# Patient Record
Sex: MALE | Race: WHITE | Employment: FULL TIME | URBAN - METROPOLITAN AREA
[De-identification: names, ages, dates, MRNs, and addresses within clinical notes are randomized per-mention and may not be internally consistent; named-entity substitution may affect disease eponyms.]

---

## 2017-01-11 PROCEDURE — 87081 CULTURE SCREEN ONLY: CPT | Performed by: INTERNAL MEDICINE

## 2017-01-11 PROCEDURE — 81003 URINALYSIS AUTO W/O SCOPE: CPT | Performed by: INTERNAL MEDICINE

## 2017-01-16 NOTE — OR NURSING
Patient states unable to attend spine class due to inability to travel and sit due to back pain. Instructed patient to view class on line.

## 2017-02-02 ENCOUNTER — APPOINTMENT (OUTPATIENT)
Dept: GENERAL RADIOLOGY | Facility: HOSPITAL | Age: 52
End: 2017-02-02
Attending: ORTHOPAEDIC SURGERY
Payer: COMMERCIAL

## 2017-02-02 ENCOUNTER — ANESTHESIA (OUTPATIENT)
Dept: SURGERY | Facility: HOSPITAL | Age: 52
End: 2017-02-02

## 2017-02-02 ENCOUNTER — HOSPITAL ENCOUNTER (OUTPATIENT)
Facility: HOSPITAL | Age: 52
Setting detail: HOSPITAL OUTPATIENT SURGERY
Discharge: HOME | End: 2017-02-02
Attending: ORTHOPAEDIC SURGERY | Admitting: ORTHOPAEDIC SURGERY
Payer: COMMERCIAL

## 2017-02-02 ENCOUNTER — ANESTHESIA EVENT (OUTPATIENT)
Dept: SURGERY | Facility: HOSPITAL | Age: 52
End: 2017-02-02

## 2017-02-02 ENCOUNTER — SURGERY (OUTPATIENT)
Age: 52
End: 2017-02-02

## 2017-02-02 VITALS
HEIGHT: 71 IN | TEMPERATURE: 98 F | HEART RATE: 95 BPM | RESPIRATION RATE: 16 BRPM | OXYGEN SATURATION: 98 % | WEIGHT: 209 LBS | BODY MASS INDEX: 29.26 KG/M2 | SYSTOLIC BLOOD PRESSURE: 134 MMHG | DIASTOLIC BLOOD PRESSURE: 83 MMHG

## 2017-02-02 DIAGNOSIS — G89.29 CHRONIC BILATERAL LOW BACK PAIN WITH LEFT-SIDED SCIATICA: Primary | ICD-10-CM

## 2017-02-02 DIAGNOSIS — M54.42 CHRONIC BILATERAL LOW BACK PAIN WITH LEFT-SIDED SCIATICA: Primary | ICD-10-CM

## 2017-02-02 PROCEDURE — 88311 DECALCIFY TISSUE: CPT | Performed by: ORTHOPAEDIC SURGERY

## 2017-02-02 PROCEDURE — 88304 TISSUE EXAM BY PATHOLOGIST: CPT | Performed by: ORTHOPAEDIC SURGERY

## 2017-02-02 PROCEDURE — 72020 X-RAY EXAM OF SPINE 1 VIEW: CPT | Performed by: ORTHOPAEDIC SURGERY

## 2017-02-02 PROCEDURE — 0ST20ZZ RESECTION OF LUMBAR VERTEBRAL DISC, OPEN APPROACH: ICD-10-PCS | Performed by: ORTHOPAEDIC SURGERY

## 2017-02-02 PROCEDURE — 01NB0ZZ RELEASE LUMBAR NERVE, OPEN APPROACH: ICD-10-PCS | Performed by: ORTHOPAEDIC SURGERY

## 2017-02-02 DEVICE — FLOSEAL SEALENT STERILE 10ML: Type: IMPLANTABLE DEVICE | Site: BACK | Status: FUNCTIONAL

## 2017-02-02 DEVICE — DURAGENXS MATRIX DURAL REGENERATION MATRIX IS AN ABSORBABLE IMPLANT FOR REPAIR OF DURAL DEFECTS. DURAGENXS MATRIX IS AN EASY TO HANDLE, SOFT, WHITE, PLIABLE, NONFRIABLE, POROUS COLLAGEN MATRIX. DURAGENXS MATRIX IS SUPPLIED STERILE, NONPYROGENIC, FOR SINGLE USE IN DOUBLE PEEL PACKAGES IN A VARIETY OF SIZES.
Type: IMPLANTABLE DEVICE | Site: BACK | Status: FUNCTIONAL
Brand: DURAGEN XS™ DURAL REGENERATION MATRIX

## 2017-02-02 RX ORDER — MIDAZOLAM HYDROCHLORIDE 1 MG/ML
1 INJECTION INTRAMUSCULAR; INTRAVENOUS EVERY 5 MIN PRN
Status: DISCONTINUED | OUTPATIENT
Start: 2017-02-02 | End: 2017-02-02

## 2017-02-02 RX ORDER — BUPIVACAINE HYDROCHLORIDE AND EPINEPHRINE 5; 5 MG/ML; UG/ML
INJECTION, SOLUTION EPIDURAL; INTRACAUDAL; PERINEURAL AS NEEDED
Status: DISCONTINUED | OUTPATIENT
Start: 2017-02-02 | End: 2017-02-02 | Stop reason: HOSPADM

## 2017-02-02 RX ORDER — MEPERIDINE HYDROCHLORIDE 25 MG/ML
INJECTION INTRAMUSCULAR; INTRAVENOUS; SUBCUTANEOUS
Status: COMPLETED
Start: 2017-02-02 | End: 2017-02-02

## 2017-02-02 RX ORDER — DIPHENHYDRAMINE HYDROCHLORIDE 50 MG/ML
12.5 INJECTION INTRAMUSCULAR; INTRAVENOUS AS NEEDED
Status: DISCONTINUED | OUTPATIENT
Start: 2017-02-02 | End: 2017-02-02

## 2017-02-02 RX ORDER — NALOXONE HYDROCHLORIDE 0.4 MG/ML
80 INJECTION, SOLUTION INTRAMUSCULAR; INTRAVENOUS; SUBCUTANEOUS AS NEEDED
Status: DISCONTINUED | OUTPATIENT
Start: 2017-02-02 | End: 2017-02-02

## 2017-02-02 RX ORDER — HYDROMORPHONE HYDROCHLORIDE 1 MG/ML
0.4 INJECTION, SOLUTION INTRAMUSCULAR; INTRAVENOUS; SUBCUTANEOUS EVERY 5 MIN PRN
Status: DISCONTINUED | OUTPATIENT
Start: 2017-02-02 | End: 2017-02-02

## 2017-02-02 RX ORDER — HYDROCODONE BITARTRATE AND ACETAMINOPHEN 5; 325 MG/1; MG/1
1 TABLET ORAL AS NEEDED
Status: COMPLETED | OUTPATIENT
Start: 2017-02-02 | End: 2017-02-02

## 2017-02-02 RX ORDER — SODIUM CHLORIDE, SODIUM LACTATE, POTASSIUM CHLORIDE, CALCIUM CHLORIDE 600; 310; 30; 20 MG/100ML; MG/100ML; MG/100ML; MG/100ML
INJECTION, SOLUTION INTRAVENOUS CONTINUOUS
Status: DISCONTINUED | OUTPATIENT
Start: 2017-02-02 | End: 2017-02-02

## 2017-02-02 RX ORDER — ONDANSETRON 2 MG/ML
4 INJECTION INTRAMUSCULAR; INTRAVENOUS AS NEEDED
Status: DISCONTINUED | OUTPATIENT
Start: 2017-02-02 | End: 2017-02-02

## 2017-02-02 RX ORDER — BACITRACIN 50000 [USP'U]/1
INJECTION, POWDER, LYOPHILIZED, FOR SOLUTION INTRAMUSCULAR AS NEEDED
Status: DISCONTINUED | OUTPATIENT
Start: 2017-02-02 | End: 2017-02-02 | Stop reason: HOSPADM

## 2017-02-02 RX ORDER — HYDROMORPHONE HYDROCHLORIDE 1 MG/ML
INJECTION, SOLUTION INTRAMUSCULAR; INTRAVENOUS; SUBCUTANEOUS
Status: COMPLETED
Start: 2017-02-02 | End: 2017-02-02

## 2017-02-02 RX ORDER — MEPERIDINE HYDROCHLORIDE 25 MG/ML
12.5 INJECTION INTRAMUSCULAR; INTRAVENOUS; SUBCUTANEOUS AS NEEDED
Status: COMPLETED | OUTPATIENT
Start: 2017-02-02 | End: 2017-02-02

## 2017-02-02 RX ORDER — DEXAMETHASONE SODIUM PHOSPHATE 4 MG/ML
4 VIAL (ML) INJECTION AS NEEDED
Status: DISCONTINUED | OUTPATIENT
Start: 2017-02-02 | End: 2017-02-02

## 2017-02-02 RX ORDER — HYDROCODONE BITARTRATE AND ACETAMINOPHEN 5; 325 MG/1; MG/1
2 TABLET ORAL AS NEEDED
Status: COMPLETED | OUTPATIENT
Start: 2017-02-02 | End: 2017-02-02

## 2017-02-02 NOTE — OR NURSING
Discharge instructions discussed with patient and wife, verbalized understanding. Patient denies any numbness or tingling to legs. 3+ pedal pulses noted to bilateral feet, pink and warm to touch. Waiting for patient to urinate.  Pt resting comfortably in c

## 2017-02-02 NOTE — INTERVAL H&P NOTE
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   I

## 2017-02-02 NOTE — H&P (VIEW-ONLY)
Amandeep Ferguson is a 46year old male Patient presents with:  Pre-Op Exam:  surgery on 02/02/17      I was asked to see pt today by Dr Reva Ybarra for preoperative evaluation.  Pt is planning to undergo L4-L5 microdecompression and diskectomy for lumbar degenerativ Standard drinks or equivalent per week       Comment: Wine 3-4 a day. Once in a while scotch                 but rarely.            Current Outpatient Prescriptions:  HYDROcodone-acetaminophen  MG Oral Tab Take 1-2 tablets by mouth every 4-6 hours as temperature 98.2 °F (36.8 °C), temperature source Oral, weight 211 lb (95.709 kg), SpO2 98 %.    Genl: NAD  HEENT: posterior pharynx clear                ears TM normal b/l                nares nl b/l  Neck: no LAD, no thyromegaly  Lungs CTA b/l  CV RRR  Ab

## 2017-02-02 NOTE — BRIEF OP NOTE
[facility] SURGERY  Brief Op Note     [facility] Location: OR   CSN [id number] MRN [id number]   Admission Date 2/2/2017 Operation Date 2/2/2017   Attending Physician Dominick Jackson MD Operating Physician PEPE Beard       Pre-Operative Daivd Oswaldo

## 2017-02-02 NOTE — ANESTHESIA PREPROCEDURE EVALUATION
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   Temp: 98.2 °F ( OTHER SURGICAL HISTORY      Comment Lasik     OTHER SURGICAL HISTORY  1-28-11    Comment b/l vas, dr Nazanin Nugent N/A 6/6/2014    Comment Procedure: COLONOSCOPY, POSSIBLE BIOPSY, POSSIBLE POLYPECTOMY 41345;  Surgeon: Nadeem Beltran MD;  Jovita Baugh hours.  Post-procedure pain management plan discussed with surgeon and patient.       Plan/risks discussed with: patient

## 2017-02-02 NOTE — OR NURSING
Ice pack placed to surgical area. Use of ice pack explained to patient and wife, verbalized understanding.

## 2017-02-02 NOTE — ANESTHESIA POSTPROCEDURE EVALUATION
[facility] 27 Patient Status:  Outpatient in a Bed   Age/Gender 45year old male MRN [id number]   [facility] SURGERY Attending Mony Salgado MD   Hosp Day # 0 PCP Keiko Gross MD       Anesthesia Post-op Note    Proced

## 2017-02-22 NOTE — OPERATIVE REPORT
[facility]    PATIENT'S NAME: Min Theosonny   ATTENDING PHYSICIAN: Dirk Waddell M.D. OPERATING PHYSICIAN: Dirk Waddell M.D.    PATIENT ACCOUNT#:   [de-identified]    LOCATION:  [address]  MEDICAL RECORD #:   [id number]       DATE taken at all levels outlined above. Self-retaining retractors were applied. A curet was used to gain access to the canal and to free ligamentum flavum, which was removed with 4 and 5 mm Kerrison.   The nerve roots were decompressed using undercutting tech extubated and taken to the recovery room in stable condition and neurologically intact on arrival.    Dictated By Laura Kenney M.D.  d: 02/21/2017 62:40:21  t: 02/22/2017 03:42:27  Esperanza Hall [id number]  [facility]/

## 2017-06-06 PROCEDURE — 83001 ASSAY OF GONADOTROPIN (FSH): CPT | Performed by: INTERNAL MEDICINE

## 2017-06-06 PROCEDURE — 84402 ASSAY OF FREE TESTOSTERONE: CPT | Performed by: INTERNAL MEDICINE

## 2017-06-06 PROCEDURE — 84403 ASSAY OF TOTAL TESTOSTERONE: CPT | Performed by: INTERNAL MEDICINE

## 2017-06-06 PROCEDURE — 83002 ASSAY OF GONADOTROPIN (LH): CPT | Performed by: INTERNAL MEDICINE

## 2018-12-28 PROCEDURE — 84153 ASSAY OF PSA TOTAL: CPT | Performed by: INTERNAL MEDICINE

## 2018-12-28 PROCEDURE — 86803 HEPATITIS C AB TEST: CPT | Performed by: INTERNAL MEDICINE

## 2019-04-16 PROBLEM — Z98.890 S/P LUMBAR LAMINECTOMY: Status: ACTIVE | Noted: 2019-04-16

## 2019-11-27 PROCEDURE — 88305 TISSUE EXAM BY PATHOLOGIST: CPT | Performed by: INTERNAL MEDICINE

## (undated) DEVICE — SUTURE VICRYL 2-0 FSL

## (undated) DEVICE — DRILL SRG OIL CRTDG MAESTRO

## (undated) DEVICE — UNDYED BRAIDED (POLYGLACTIN 910), SYNTHETIC ABSORBABLE SUTURE: Brand: COATED VICRYL

## (undated) DEVICE — SYRINGE 30ML LL TIP

## (undated) DEVICE — Device

## (undated) DEVICE — LAMINECTOMY CDS: Brand: MEDLINE INDUSTRIES, INC.

## (undated) DEVICE — 3M™ MICROFOAM™ TAPE 1528-4: Brand: 3M™ MICROFOAM™

## (undated) DEVICE — 3.0MM PRECISION NEURO (MATCH HEAD)

## (undated) DEVICE — SOL  .9 1000ML BTL

## (undated) DEVICE — LIGHT HANDLE

## (undated) DEVICE — GLOVE SURG TRIUMPH SZ 71/2

## (undated) DEVICE — IMPAD RIGID SOLE FOOT COVER, MEDIUM: Brand: A-V IMPULSE

## (undated) DEVICE — SUTURE VICRYL 1 OS-6

## (undated) DEVICE — TRANSPOSAL ULTRAFLEX DUO/QUAD ULTRA CART MANIFOLD

## (undated) DEVICE — FLOSEAL SEALENT STERILE 10ML

## (undated) NOTE — IP AVS SNAPSHOT
[facility] [place]  [address] [place], [address] ~ [phone]                Discharge Summary   2/2/2017    Abhishek Alvarado           Admission Information        Provider Department    2/2/2017 Anna Talavera MD  Marni Babb / Mela Clarke [facility] OR                     Patient Instructions       Lumbar Microdiscectomy/Laminectomy Discharge Instructions (Dr. Jaky Aguiar)    Activity  Restrictions  ? No twisting, pulling, pushing or lifting > 10 lbs.   ? No lifting anything ov ? No tub baths, pools, or saunas for 6 weeks and until cleared by surgeon. ? Do not shower until there is no drainage from the incision. Drainage usually stops within 3-4 days after surgery  ? When able to shower, keep the incision as dry as possible.   ? o NSAIDS (non-steroidal anti- inflammatory) include: Motrin, ibuprofen, Advil, Aleve, Naprosyn, Indocin, Nuprin, Vioxx, Celebrex, Bextra. (COMPLETE LIST IN GUIDEBOOK APPENDIX)  o Tylenol (acetaminophen) is okay.  Caution if your pain med contains Tylenol (ac · Alcoholic beverages should be avoided while taking narcotics      General Post Op Instructions:  1. Do some nice big deep breathing-  ·  This can prevent pneumonia  2.  Ambulate:   · Get up and walk several times a day-not strenuous  · Do Foot pumps when Recent Hematology Lab Results (cont.)  (Last 3 results in the past 90 days)    Neutrophil % Lymphocyte % Monocyte % Eosinophil % Basophil % Prelim Neut Abs Final Neut Abs Lymphocyte Abso Monocyte Absolu Eosinophil Abso Basophil Absolu    (01/11/17)  54.5 ( 2/2/2017  5:42 AM  Pre-Op / [facility] [phone]         We want to hear from you       We want to hear from you, please share your experience with us by returning the survey you will receive in the mail. Thank you!         MyChart     Visit MyChart  You c